# Patient Record
Sex: FEMALE | Race: WHITE | NOT HISPANIC OR LATINO | Employment: FULL TIME | URBAN - METROPOLITAN AREA
[De-identification: names, ages, dates, MRNs, and addresses within clinical notes are randomized per-mention and may not be internally consistent; named-entity substitution may affect disease eponyms.]

---

## 2022-07-28 PROBLEM — U09.9 POST-ACUTE COVID-19 SYNDROME: Status: ACTIVE | Noted: 2022-07-05

## 2022-07-28 PROBLEM — F41.1 GENERALIZED ANXIETY DISORDER: Status: ACTIVE | Noted: 2020-02-28

## 2024-03-27 ENCOUNTER — TELEPHONE (OUTPATIENT)
Dept: RHEUMATOLOGY | Facility: CLINIC | Age: 64
End: 2024-03-27
Payer: MEDICARE

## 2024-03-27 NOTE — TELEPHONE ENCOUNTER
----- Message from Kinsey Serrano sent at 3/27/2024  4:22 PM CDT -----  Contact: Angeles  Patient called stating she received a message from Ramona to schedule appointment, but was not told what doctor to schedule with. Please callback [phone] to advise

## 2024-06-05 ENCOUNTER — OFFICE VISIT (OUTPATIENT)
Dept: RHEUMATOLOGY | Facility: CLINIC | Age: 64
End: 2024-06-05
Payer: MEDICARE

## 2024-06-05 ENCOUNTER — HOSPITAL ENCOUNTER (OUTPATIENT)
Dept: RADIOLOGY | Facility: HOSPITAL | Age: 64
Discharge: HOME | End: 2024-06-05
Attending: STUDENT IN AN ORGANIZED HEALTH CARE EDUCATION/TRAINING PROGRAM
Payer: MEDICARE

## 2024-06-05 VITALS
SYSTOLIC BLOOD PRESSURE: 139 MMHG | HEIGHT: 71 IN | BODY MASS INDEX: 26.79 KG/M2 | WEIGHT: 191.38 LBS | HEART RATE: 87 BPM | DIASTOLIC BLOOD PRESSURE: 80 MMHG

## 2024-06-05 DIAGNOSIS — M15.9 PRIMARY OSTEOARTHRITIS INVOLVING MULTIPLE JOINTS: ICD-10-CM

## 2024-06-05 DIAGNOSIS — M25.40 JOINT SWELLING: ICD-10-CM

## 2024-06-05 DIAGNOSIS — M81.0 AGE-RELATED OSTEOPOROSIS WITHOUT CURRENT PATHOLOGICAL FRACTURE: Primary | ICD-10-CM

## 2024-06-05 PROCEDURE — 99999 PR PBB SHADOW E&M-EST. PATIENT-LVL III: CPT | Mod: PBBFAC,,, | Performed by: STUDENT IN AN ORGANIZED HEALTH CARE EDUCATION/TRAINING PROGRAM

## 2024-06-05 PROCEDURE — 99205 OFFICE O/P NEW HI 60 MIN: CPT | Mod: S$GLB,,, | Performed by: STUDENT IN AN ORGANIZED HEALTH CARE EDUCATION/TRAINING PROGRAM

## 2024-06-05 PROCEDURE — 73130 X-RAY EXAM OF HAND: CPT | Mod: 26,50,, | Performed by: RADIOLOGY

## 2024-06-05 PROCEDURE — 1160F RVW MEDS BY RX/DR IN RCRD: CPT | Mod: CPTII,S$GLB,, | Performed by: STUDENT IN AN ORGANIZED HEALTH CARE EDUCATION/TRAINING PROGRAM

## 2024-06-05 PROCEDURE — 1159F MED LIST DOCD IN RCRD: CPT | Mod: CPTII,S$GLB,, | Performed by: STUDENT IN AN ORGANIZED HEALTH CARE EDUCATION/TRAINING PROGRAM

## 2024-06-05 PROCEDURE — 3075F SYST BP GE 130 - 139MM HG: CPT | Mod: CPTII,S$GLB,, | Performed by: STUDENT IN AN ORGANIZED HEALTH CARE EDUCATION/TRAINING PROGRAM

## 2024-06-05 PROCEDURE — 3288F FALL RISK ASSESSMENT DOCD: CPT | Mod: CPTII,S$GLB,, | Performed by: STUDENT IN AN ORGANIZED HEALTH CARE EDUCATION/TRAINING PROGRAM

## 2024-06-05 PROCEDURE — 73130 X-RAY EXAM OF HAND: CPT | Mod: TC,50

## 2024-06-05 PROCEDURE — 3008F BODY MASS INDEX DOCD: CPT | Mod: CPTII,S$GLB,, | Performed by: STUDENT IN AN ORGANIZED HEALTH CARE EDUCATION/TRAINING PROGRAM

## 2024-06-05 PROCEDURE — 3079F DIAST BP 80-89 MM HG: CPT | Mod: CPTII,S$GLB,, | Performed by: STUDENT IN AN ORGANIZED HEALTH CARE EDUCATION/TRAINING PROGRAM

## 2024-06-05 PROCEDURE — 1125F AMNT PAIN NOTED PAIN PRSNT: CPT | Mod: CPTII,S$GLB,, | Performed by: STUDENT IN AN ORGANIZED HEALTH CARE EDUCATION/TRAINING PROGRAM

## 2024-06-05 PROCEDURE — 1101F PT FALLS ASSESS-DOCD LE1/YR: CPT | Mod: CPTII,S$GLB,, | Performed by: STUDENT IN AN ORGANIZED HEALTH CARE EDUCATION/TRAINING PROGRAM

## 2024-06-05 RX ORDER — ALENDRONATE SODIUM 70 MG/1
70 TABLET ORAL
Qty: 4 TABLET | Refills: 11 | Status: SHIPPED | OUTPATIENT
Start: 2024-06-05 | End: 2025-06-05

## 2024-06-05 RX ORDER — HYDROXYCHLOROQUINE SULFATE 200 MG/1
400 TABLET, FILM COATED ORAL DAILY
Qty: 60 TABLET | Refills: 3 | Status: SHIPPED | OUTPATIENT
Start: 2024-06-05

## 2024-06-05 NOTE — PROGRESS NOTES
"Subjective:      Patient ID: Angeles Mac is a 63 y.o. female.    Chief Complaint: osteoporosis and osteoarthritis    HPI:   Patient presents for Rheumatology evaluation for osteoporosis. Also wants to talk about her joints.    Hand joint pain started in her 40s. Pain and deformities in the hands (PIPs and DIPs). Uses wraps and braces to drive here due to the severe pain. Uses tramadol, diclofenac gel, 2 Aleve gel caps for pain. Tylenol arthritis extended release didn't help. Meloxicam, Celebrex, prescription naproxen upset stomach.  Uses good feet insoles which helped with foot, knee, low back pain.  Low back surgery 12 years ago for low back pain with sciatica and this helped the symptoms. Now uses Ab Doer 360 daily and heating pad daily for back stretches and exercise. Might use muscle relaxant at night. Then wakes up without pain.    She had her first DXA scan in 3/2024 and it showed osteoporosis.  Had hysterectomy in her 30s with one ovary remaining. Went through menopause in mid-50s. Never took HRT.  Previous bone strengthening agents: No  Taking Vitamin D supplements: No  Invasive dental work: Has not seen a dentist in years.  History of falls: Tripped years ago, nothing regularly.  Personal hx of fractures: No  Family hx of fractures: No  Acid reflux: No  History of skeletal metastases or radiation to the skeleton: No  History of kidney stones: No    Has macular degeneration in the right eye, maybe left as well.  Tolerated low dose prednisone for the low back in the past. High dose prednisone caused side effects (mood, tachycardia, shingles infection).    Social Hx: Never smoker. Occasional alcohol use. Green tea in the winter. 1 cup of coffee in the morning. Rare soda use.  Family Hx: Mother had PV and  from lymphoma. Maternal aunt  of leukemia. Niece has PV. No family history of autoimmune disease.    Objective:   /80   Pulse 87   Ht 5' 11" (1.803 m)   Wt 86.8 kg (191 lb 5.8 oz)   BMI " 26.69 kg/m²   Physical Exam  Constitutional:       General: She is not in acute distress.     Appearance: Normal appearance.   HENT:      Head: Normocephalic and atraumatic.      Mouth/Throat:      Mouth: Mucous membranes are moist.      Pharynx: Oropharynx is clear.   Cardiovascular:      Rate and Rhythm: Normal rate and regular rhythm.   Pulmonary:      Effort: Pulmonary effort is normal.      Breath sounds: Normal breath sounds.   Abdominal:      Palpations: Abdomen is soft.      Tenderness: There is no abdominal tenderness.   Musculoskeletal:      Cervical back: Normal range of motion. No tenderness.      Comments: Bony enlargement and low grade reactive synovitis in the right 4th PIP with tenderness. Bony enlargement with Heberden nodes in several DIPs bilaterally.   Skin:     General: Skin is warm and dry.   Neurological:      Mental Status: She is alert and oriented to person, place, and time. Mental status is at baseline.           Assessment:     1. Age-related osteoporosis without current pathological fracture    2. Primary osteoarthritis involving multiple joints    3. Joint swelling          Plan:     Problem List Items Addressed This Visit    None  Visit Diagnoses       Age-related osteoporosis without current pathological fracture    -  Primary    Relevant Medications    hydroxychloroquine (PLAQUENIL) 200 mg tablet    Other Relevant Orders    Vitamin D    PTH, Intact    Primary osteoarthritis involving multiple joints        Relevant Medications    alendronate (FOSAMAX) 70 MG tablet    Other Relevant Orders    MISSY Screen w/Reflex    CBC Auto Differential    Comprehensive Metabolic Panel    C-Reactive Protein    Cyclic Citrullinated Peptide Antibody, IgG    HIV 1/2 Ag/Ab (4th Gen)    Hepatitis B surface antigen    HBcAB    Hepatitis B surface antibody    Hepatitis C antibody    Strongyloides IgG Antibodies    Quantiferon Gold TB    Treponema Pallidium Antibodies IgG, IgM    Rheumatoid Factor     Sedimentation rate    X-Ray Hand Complete Bilateral    Joint swelling        Relevant Medications    hydroxychloroquine (PLAQUENIL) 200 mg tablet    Other Relevant Orders    MISSY Screen w/Reflex    CBC Auto Differential    Comprehensive Metabolic Panel    C-Reactive Protein    Cyclic Citrullinated Peptide Antibody, IgG    HIV 1/2 Ag/Ab (4th Gen)    Hepatitis B surface antigen    HBcAB    Hepatitis B surface antibody    Hepatitis C antibody    Strongyloides IgG Antibodies    Quantiferon Gold TB    Treponema Pallidium Antibodies IgG, IgM    Rheumatoid Factor    Sedimentation rate    X-Ray Hand Complete Bilateral            Patient presents for Rheumatology evaluation for osteoporosis. Also wants to talk about her joints.        Osteoporosis  Had her first DXA 3/13/2024:    Has not seen a dentist in years.     Plan:  Labs: vitamin D, PTH  Get dental cleaning and clearance to start Fosamax. Start Fosamax after dental clearance.  Take vitamin D 1000 units daily.  Get calcium 1200 mg from your diet daily.  Osteoporosis precautions:  - Regular exercise: aerobic, strength, flexibility, and balance.  - Limitation of alcohol consumption to </= 2 drinks per day.  - Limitation of caffeine consumption to </= 2 servings per day.  - Fall prevention, avoid high impact/twisting motion, do not flop into a chair.  Repeat DXA in 2 years on or after 3/14/2026.        Joint pain  Suspect OA or erosive OA based on history and exam.  Has macular degeneration in the right eye, maybe left as well.  Tolerated low dose prednisone for the low back in the past. High dose prednisone caused side effects (mood, tachycardia, shingles infection).    Plan:  Labs: CBC, CMP, ESR, CRP, RF, CCP, MISSY  XR hands  Start Plaquenil 400 mg daily  Start turmeric supplements 1000 mg 2 times daily for anti-inflammatory benefit.  Start osteo Bi-Flex triple strength 1 capsule 2 times daily for joint protection.  Try high fiber whole food plant based diet with fewer  animal products and sugars.        Follow up in about 6 months (around 12/5/2024).       I spent a total of 60 minutes on the day of the visit.  This includes face to face time and non-face to face time preparing to see the patient (eg, review of tests), obtaining and/or reviewing separately obtained history, documenting clinical information in the electronic or other health record, independently interpreting results and communicating results to the patient/family/caregiver, or care coordinator.

## 2024-06-05 NOTE — PATIENT INSTRUCTIONS
Labs  X-ray hands  Start Plaquenil 400 mg daily  Start turmeric supplements 1000 mg 2 times daily for anti-inflammatory benefit.  Start osteo Bi-Flex triple strength 1 capsule 2 times daily for joint protection.  Try high fiber whole food plant based diet with fewer animal products and sugars.  Get dental cleaning and clearance to start Fosamax. Start Fosamax after dental clearance. Fosamax has to be taken first thing in the morning on an empty stomach with 6-8 oz of water. Do not take it with other beverages. You must remain upright (sitting or standing--no lying down) for 30 minutes after taking the medication. Do not take any additional medications, beverages, or food for 45-60 minutes after taking the medication.   Take vitamin D 1000 units daily.  Get calcium 1200 mg from your diet daily.  Osteoporosis precautions:  - Regular exercise: aerobic, strength, flexibility, and balance.  - Limitation of alcohol consumption to </= 2 drinks per day.  - Limitation of caffeine consumption to </= 2 servings per day.  - Fall prevention, avoid high impact/twisting motion, do not flop into a chair.

## 2024-07-05 ENCOUNTER — TELEPHONE (OUTPATIENT)
Dept: RHEUMATOLOGY | Facility: CLINIC | Age: 64
End: 2024-07-05
Payer: MEDICARE

## 2024-07-05 DIAGNOSIS — E55.9 VITAMIN D DEFICIENCY: Primary | ICD-10-CM

## 2024-07-05 RX ORDER — ERGOCALCIFEROL 1.25 MG/1
50000 CAPSULE ORAL
Qty: 12 CAPSULE | Refills: 2 | Status: SHIPPED | OUTPATIENT
Start: 2024-07-05

## 2024-07-05 NOTE — TELEPHONE ENCOUNTER
----- Message from Leandro De Jesus MD sent at 7/5/2024 10:42 AM CDT -----  Please let her know vitamin D was low so I have prescribed a weekly vitamin D supplement. She should also continue over-the-counter vitamin D 1000 units daily. Repeat labs in 3 months.

## 2024-12-11 ENCOUNTER — LAB VISIT (OUTPATIENT)
Dept: LAB | Facility: HOSPITAL | Age: 64
End: 2024-12-11
Attending: STUDENT IN AN ORGANIZED HEALTH CARE EDUCATION/TRAINING PROGRAM
Payer: MEDICARE

## 2024-12-11 ENCOUNTER — OFFICE VISIT (OUTPATIENT)
Dept: RHEUMATOLOGY | Facility: CLINIC | Age: 64
End: 2024-12-11
Payer: MEDICARE

## 2024-12-11 VITALS
HEIGHT: 71 IN | HEART RATE: 87 BPM | BODY MASS INDEX: 26.69 KG/M2 | SYSTOLIC BLOOD PRESSURE: 141 MMHG | DIASTOLIC BLOOD PRESSURE: 78 MMHG

## 2024-12-11 DIAGNOSIS — M15.4 EROSIVE OSTEOARTHRITIS: ICD-10-CM

## 2024-12-11 DIAGNOSIS — E55.9 VITAMIN D DEFICIENCY: ICD-10-CM

## 2024-12-11 DIAGNOSIS — M81.0 AGE-RELATED OSTEOPOROSIS WITHOUT CURRENT PATHOLOGICAL FRACTURE: Primary | ICD-10-CM

## 2024-12-11 DIAGNOSIS — M81.0 AGE-RELATED OSTEOPOROSIS WITHOUT CURRENT PATHOLOGICAL FRACTURE: ICD-10-CM

## 2024-12-11 LAB
ALBUMIN SERPL BCP-MCNC: 4.1 G/DL (ref 3.5–5.2)
ALP SERPL-CCNC: 83 U/L (ref 40–150)
ALT SERPL W/O P-5'-P-CCNC: 21 U/L (ref 10–44)
ANION GAP SERPL CALC-SCNC: 7 MMOL/L (ref 8–16)
AST SERPL-CCNC: 24 U/L (ref 10–40)
BILIRUB SERPL-MCNC: 0.3 MG/DL (ref 0.1–1)
BUN SERPL-MCNC: 20 MG/DL (ref 8–23)
CALCIUM SERPL-MCNC: 9.8 MG/DL (ref 8.7–10.5)
CHLORIDE SERPL-SCNC: 109 MMOL/L (ref 95–110)
CO2 SERPL-SCNC: 25 MMOL/L (ref 23–29)
CREAT SERPL-MCNC: 0.8 MG/DL (ref 0.5–1.4)
EST. GFR  (NO RACE VARIABLE): >60 ML/MIN/1.73 M^2
GLUCOSE SERPL-MCNC: 99 MG/DL (ref 70–110)
POTASSIUM SERPL-SCNC: 4.4 MMOL/L (ref 3.5–5.1)
PROT SERPL-MCNC: 7.6 G/DL (ref 6–8.4)
SODIUM SERPL-SCNC: 141 MMOL/L (ref 136–145)

## 2024-12-11 PROCEDURE — 83970 ASSAY OF PARATHORMONE: CPT | Performed by: STUDENT IN AN ORGANIZED HEALTH CARE EDUCATION/TRAINING PROGRAM

## 2024-12-11 PROCEDURE — 36415 COLL VENOUS BLD VENIPUNCTURE: CPT | Performed by: STUDENT IN AN ORGANIZED HEALTH CARE EDUCATION/TRAINING PROGRAM

## 2024-12-11 PROCEDURE — 1160F RVW MEDS BY RX/DR IN RCRD: CPT | Mod: CPTII,S$GLB,, | Performed by: STUDENT IN AN ORGANIZED HEALTH CARE EDUCATION/TRAINING PROGRAM

## 2024-12-11 PROCEDURE — 1125F AMNT PAIN NOTED PAIN PRSNT: CPT | Mod: CPTII,S$GLB,, | Performed by: STUDENT IN AN ORGANIZED HEALTH CARE EDUCATION/TRAINING PROGRAM

## 2024-12-11 PROCEDURE — 80053 COMPREHEN METABOLIC PANEL: CPT | Performed by: STUDENT IN AN ORGANIZED HEALTH CARE EDUCATION/TRAINING PROGRAM

## 2024-12-11 PROCEDURE — 3078F DIAST BP <80 MM HG: CPT | Mod: CPTII,S$GLB,, | Performed by: STUDENT IN AN ORGANIZED HEALTH CARE EDUCATION/TRAINING PROGRAM

## 2024-12-11 PROCEDURE — 3008F BODY MASS INDEX DOCD: CPT | Mod: CPTII,S$GLB,, | Performed by: STUDENT IN AN ORGANIZED HEALTH CARE EDUCATION/TRAINING PROGRAM

## 2024-12-11 PROCEDURE — 3077F SYST BP >= 140 MM HG: CPT | Mod: CPTII,S$GLB,, | Performed by: STUDENT IN AN ORGANIZED HEALTH CARE EDUCATION/TRAINING PROGRAM

## 2024-12-11 PROCEDURE — 1101F PT FALLS ASSESS-DOCD LE1/YR: CPT | Mod: CPTII,S$GLB,, | Performed by: STUDENT IN AN ORGANIZED HEALTH CARE EDUCATION/TRAINING PROGRAM

## 2024-12-11 PROCEDURE — 82306 VITAMIN D 25 HYDROXY: CPT | Performed by: STUDENT IN AN ORGANIZED HEALTH CARE EDUCATION/TRAINING PROGRAM

## 2024-12-11 PROCEDURE — 3288F FALL RISK ASSESSMENT DOCD: CPT | Mod: CPTII,S$GLB,, | Performed by: STUDENT IN AN ORGANIZED HEALTH CARE EDUCATION/TRAINING PROGRAM

## 2024-12-11 PROCEDURE — 1159F MED LIST DOCD IN RCRD: CPT | Mod: CPTII,S$GLB,, | Performed by: STUDENT IN AN ORGANIZED HEALTH CARE EDUCATION/TRAINING PROGRAM

## 2024-12-11 PROCEDURE — 99999 PR PBB SHADOW E&M-EST. PATIENT-LVL III: CPT | Mod: PBBFAC,,, | Performed by: STUDENT IN AN ORGANIZED HEALTH CARE EDUCATION/TRAINING PROGRAM

## 2024-12-11 PROCEDURE — 99215 OFFICE O/P EST HI 40 MIN: CPT | Mod: S$GLB,,, | Performed by: STUDENT IN AN ORGANIZED HEALTH CARE EDUCATION/TRAINING PROGRAM

## 2024-12-11 RX ORDER — TERIPARATIDE 250 UG/ML
20 INJECTION, SOLUTION SUBCUTANEOUS DAILY
Qty: 2.4 ML | Refills: 11 | Status: ACTIVE | OUTPATIENT
Start: 2024-12-11 | End: 2025-12-11

## 2024-12-11 RX ORDER — ERGOCALCIFEROL 1.25 MG/1
50000 CAPSULE ORAL
Qty: 12 CAPSULE | Refills: 2 | Status: ACTIVE | OUTPATIENT
Start: 2024-12-11

## 2024-12-11 RX ORDER — PREDNISONE 5 MG/1
5 TABLET ORAL DAILY PRN
Qty: 30 TABLET | Refills: 3 | Status: SHIPPED | OUTPATIENT
Start: 2024-12-11

## 2024-12-11 NOTE — Clinical Note
Please call her and find out where she had her DXA scan done and get them to fax it to us. Please upload it in the Media tab when you get it. Thanks!

## 2024-12-11 NOTE — PROGRESS NOTES
Subjective:      Patient ID: Angeles Mac is a 64 y.o. female.    Chief Complaint: osteoporosis and erosive osteoarthritis.    HPI:   Patient presents for Rheumatology follow up for osteoporosis and erosive osteoarthritis. Established with Rheumatology in 06/2024 when initial screening DXA showed osteoporosis. She had vitamin D deficiency. Our plan was to replace vitamin D and get dental clearance, then start Fosamax. She went to the dentist and was told she has extensive dental work to complete with full bottom row root canals and extractions. This will take years to complete due to cost and time. She's been taking vitamin D 50,000 IU weekly for vitamin D deficiency. Also complained of pain and deformities in the hands (PIPs and DIPs). Hx low back surgery 12 years ago. Now uses Ab Doer 360 daily for back stretches and exercise. NSAIDs caused GI upset. Our workup showed erosive osteoarthritis on x-ray hands as well as MISSY+ 1:2560 with negative profile. I started her on Plaquenil 400 mg daily. However Plaquenil gave her severe watery diarrhea and she couldn't tolerate it for more than 2 weeks. She continues to use warming gloves, turmeric supplements, rarely tramadol for joint pain. Of note she got shingles and mood disturbance in the past with high dose steroids. Denies joint swelling, significant stiffness, skin rashes, oral ulcers, patchy alopecia, sicca symptoms, cardiopulmonary symptoms, eye inflammation, IBD, fevers, weight loss, Raynaud's. Rheumatology review of systems is otherwise negative.    Initial Hx:   She had her first DXA scan in 3/2024 and it showed osteoporosis.  Had hysterectomy in her 30s with one ovary remaining. Went through menopause in mid-50s. Never took HRT.  Previous bone strengthening agents: No  Taking Vitamin D supplements: No.  Gait: generally steady.  Personal hx of fractures: No  Family hx of fractures: No  Acid reflux: No  History of skeletal metastases or radiation to the  "skeleton: No  History of kidney stones: No  Has macular degeneration in the right eye, maybe left as well.     Social Hx: Never smoker. Occasional alcohol use. Green tea in the winter. 1 cup of coffee in the morning. Rare soda use.  Family Hx: Mother had PV and  from lymphoma. Maternal aunt  of leukemia. Niece has PV. No family history of autoimmune disease.    Objective:   BP (!) 141/78 (BP Location: Right arm, Patient Position: Sitting)   Pulse 87   Ht 5' 11" (1.803 m)   BMI 26.69 kg/m²   Physical Exam  Constitutional:       General: She is not in acute distress.     Appearance: Normal appearance.   HENT:      Head: Normocephalic and atraumatic.      Mouth/Throat:      Mouth: Mucous membranes are moist.      Pharynx: Oropharynx is clear.   Cardiovascular:      Rate and Rhythm: Normal rate and regular rhythm.   Pulmonary:      Effort: Pulmonary effort is normal.      Breath sounds: Normal breath sounds.   Abdominal:      Palpations: Abdomen is soft.      Tenderness: There is no abdominal tenderness.   Musculoskeletal:      Cervical back: Normal range of motion. No tenderness.      Comments: No synovitis, dactylitis, enthesitis. Bony enlargement deformities in OA pattern in hands with tenderness.   Skin:     General: Skin is warm and dry.   Neurological:      Mental Status: She is alert and oriented to person, place, and time. Mental status is at baseline.             Assessment and Plan:     Problem List Items Addressed This Visit    None  Visit Diagnoses       Age-related osteoporosis without current pathological fracture    -  Primary    Relevant Medications    teriparatide (FORTEO) 20 mcg/dose (600mcg/2.4mL) PnIj    Other Relevant Orders    Comprehensive Metabolic Panel    Vitamin D    PTH, intact    Vitamin D deficiency        Relevant Orders    PTH, intact    Erosive osteoarthritis                Patient presents for Rheumatology follow up for osteoporosis and erosive " osteoarthritis.    Osteoporosis.  Vitamin D deficiency.  DXA in 03/2024 showed osteoporosis. Our system is down today and I can not pull up the records. Will have them faxed to us so it can be in the [facility] chart. Can't use bisphosphonate, Prolia, or Evenity right now because she is going to have extensive dental work done during the next few years.     Plan:  Plan for 2 years of Forteo, then repeat DXA. Get dental work done in the meantime.  Labs today: CMP, vitamin D, PTH.  Start Forteo.  Continue vitamin D 50,000 IU weekly.  Dietary calcium 1200 mg daily.  Take vitamin D 1000 units daily.  Get calcium 1200 mg from your diet daily.  Osteoporosis precautions:  Regular exercise: aerobic, strength, flexibility, and balance.  Limitation of alcohol consumption to </= 2 drinks per day.  Limitation of caffeine consumption to </= 2 servings per day.  Fall prevention, avoid high impact/twisting motion, do not flop into a chair.        Erosive osteoarthritis.  Negative RF, CCP. Normal ESR, CRP, CBC, CMP.  MISSY+ >2560, homo, negative profile. No CTD symptoms or findings.  Reviewed her XR hands which show severe degenerative changes in an OA pattern with central erosions.    Previous medications:  Plaquenil: severe diarrhea  NSAIDs: GI upset    Has sulfa allergy.    Plan:  Prednisone 5 mg daily PRN. If needing it multiple times a week, plan to start MTX for steroid sparing.  Take Arthritis strength extended release Tylenol 650 mg 1 tablet 3 times daily as needed for pain.  Start turmeric supplements 1000 mg 2 times daily for anti-inflammatory benefit.  Start osteo Bi-Flex triple strength 1 capsule 2 times daily for joint protection.  Try high fiber whole food plant based diet with fewer animal products and sugars.        I educated patient on the risks of prednisone including weight gain, high blood pressure, avascular necrosis, infections, lowering of bone density.         Follow up in about 4 months (around 4/11/2025).          I spent a total of 43 minutes on the day of the visit.  This includes face to face time and non-face to face time preparing to see the patient (eg, review of tests), obtaining and/or reviewing separately obtained history, documenting clinical information in the electronic or other health record, independently interpreting results and communicating results to the patient/family/caregiver, or care coordinator.        Today's visit is associated with current or anticipated ongoing medical care related to this patient's diagnosis of osteoporosis, which is a chronic disease which will require regular follow up to manage symptoms and progression. The patient is to return to the office within a minimum of 3-6 months to review symptoms and function at that time. CPT code

## 2024-12-12 LAB
25(OH)D3+25(OH)D2 SERPL-MCNC: 44 NG/ML (ref 30–96)
PTH-INTACT SERPL-MCNC: 73.8 PG/ML (ref 9–77)

## 2024-12-13 ENCOUNTER — PATIENT MESSAGE (OUTPATIENT)
Dept: RHEUMATOLOGY | Facility: CLINIC | Age: 64
End: 2024-12-13
Payer: MEDICARE

## 2024-12-14 PROBLEM — M81.0 AGE-RELATED OSTEOPOROSIS WITHOUT CURRENT PATHOLOGICAL FRACTURE: Status: ACTIVE | Noted: 2024-12-14

## 2024-12-17 ENCOUNTER — TELEPHONE (OUTPATIENT)
Dept: RHEUMATOLOGY | Facility: CLINIC | Age: 64
End: 2024-12-17
Payer: MEDICARE

## 2024-12-17 NOTE — TELEPHONE ENCOUNTER
Attempted to call patient to discuss medication options. Patient did not answer. LVM for pt to return call_DD

## 2024-12-17 NOTE — TELEPHONE ENCOUNTER
----- Message from Pharmacist Grisel sent at 12/17/2024  2:57 PM CST -----  Could someone please reach out to the patient to find out why she has opted not to proceed w/ Forteo?   She has no other options for osteoporosis treatment aside from the PTH analogs, which include Forteo and Tymlos.   Per her last OV note w/ Dr. De Jesus, Can't use bisphosphonates such as alendronate nor zoledronic acid, Prolia, or Evenity right now because she is going to have extensive dental work done during the next few years.  ----- Message -----  From: Grisel Erickson, PharmD  Sent: 12/17/2024   9:22 AM CST  To: Grisel Erickson, PharmSHANE      ----- Message -----  From: Greta Grossman, PharmD  Sent: 12/16/2024  11:21 AM CST  To: Leandro De Jesus MD; [facility] Staff    Ms. Mac has decided not to proceed with Forteo.  She will be closed out from [facility] at this time.  She stated she has been trying to contact the office.  Can someone give her a call?

## 2024-12-20 NOTE — TELEPHONE ENCOUNTER
2nd Attempt to call patient to discuss medication options. Patient did not answer. LVM for pt to return call_DD

## 2025-02-06 ENCOUNTER — TELEPHONE (OUTPATIENT)
Dept: RHEUMATOLOGY | Facility: CLINIC | Age: 65
End: 2025-02-06
Payer: MEDICARE

## 2025-02-06 NOTE — TELEPHONE ENCOUNTER
----- Message from Pharmacist Grisel sent at 2/6/2025 12:31 PM CST -----  Could someone please reach out to the patient to find out why she has opted not to proceed w/ Forteo?   She has no other options for osteoporosis treatment aside from the PTH analogs, which include Forteo and Tymlos.   Per her last OV note w/ Dr. De Jesus, Can't use bisphosphonates such as alendronate nor zoledronic acid, Prolia, or Evenity right now because she is going to have extensive dental work done during the next few years.

## 2025-02-07 NOTE — TELEPHONE ENCOUNTER
LVM for patient to return call to discuss osteoporosis treatment. Patient did not answer. Left number for phone call to discuss

## 2025-03-13 ENCOUNTER — PATIENT MESSAGE (OUTPATIENT)
Dept: RHEUMATOLOGY | Facility: CLINIC | Age: 65
End: 2025-03-13
Payer: MEDICARE